# Patient Record
Sex: MALE | Race: WHITE | URBAN - METROPOLITAN AREA
[De-identification: names, ages, dates, MRNs, and addresses within clinical notes are randomized per-mention and may not be internally consistent; named-entity substitution may affect disease eponyms.]

---

## 2019-03-01 ENCOUNTER — OP HISTORICAL/CONVERTED ENCOUNTER (OUTPATIENT)
Dept: OTHER | Age: 69
End: 2019-03-01

## 2020-01-22 ENCOUNTER — OP HISTORICAL/CONVERTED ENCOUNTER (OUTPATIENT)
Dept: OTHER | Age: 70
End: 2020-01-22

## 2020-01-29 ENCOUNTER — OP HISTORICAL/CONVERTED ENCOUNTER (OUTPATIENT)
Dept: OTHER | Age: 70
End: 2020-01-29

## 2021-04-13 PROBLEM — H93.8X2 SENSATION OF FULLNESS IN LEFT EAR: Status: ACTIVE | Noted: 2021-04-13

## 2021-04-13 PROBLEM — H66.92 LEFT OTITIS MEDIA: Status: ACTIVE | Noted: 2021-04-13

## 2021-04-13 PROBLEM — H91.92 DECREASED HEARING OF LEFT EAR: Status: ACTIVE | Noted: 2021-04-13

## 2021-04-16 ENCOUNTER — OFFICE VISIT (OUTPATIENT)
Dept: ENT CLINIC | Age: 71
End: 2021-04-16
Payer: MEDICARE

## 2021-04-16 VITALS
SYSTOLIC BLOOD PRESSURE: 150 MMHG | TEMPERATURE: 98.9 F | DIASTOLIC BLOOD PRESSURE: 84 MMHG | OXYGEN SATURATION: 98 % | WEIGHT: 209.4 LBS | BODY MASS INDEX: 27.75 KG/M2 | HEART RATE: 100 BPM | HEIGHT: 73 IN | RESPIRATION RATE: 16 BRPM

## 2021-04-16 DIAGNOSIS — H93.8X2 PLUGGED FEELING IN EAR, LEFT: ICD-10-CM

## 2021-04-16 DIAGNOSIS — H91.92 DECREASED HEARING, LEFT: ICD-10-CM

## 2021-04-16 DIAGNOSIS — H69.83 ETD (EUSTACHIAN TUBE DYSFUNCTION), BILATERAL: ICD-10-CM

## 2021-04-16 DIAGNOSIS — H65.22 LEFT CHRONIC SEROUS OTITIS MEDIA: Primary | ICD-10-CM

## 2021-04-16 PROCEDURE — 99203 OFFICE O/P NEW LOW 30 MIN: CPT | Performed by: OTOLARYNGOLOGY

## 2021-04-16 NOTE — LETTER
4/19/2021 Patient: Dahlia Hill YOB: 1950 Date of Visit: 4/16/2021 Herson Brewster MD 
[address] [facility] [address] Via Fax: [phone] Dear Herson Brewster MD, Thank you for referring Mr. Youlanda Snellen to [address] [place] [address] NOSE, THROAT AND ALLERGY [facility] for evaluation. My notes for this consultation are attached. If you have questions, please do not hesitate to call me. I look forward to following your patient along with you. Sincerely, Cheli Tran MD

## 2021-04-16 NOTE — PROGRESS NOTES
Chief Complaint   Patient presents with    New Patient     Left Ear Clogged    Ringing in Ear     left x 2mths     Visit Vitals  BP (!) 150/84 (BP 1 Location: Left upper arm, BP Patient Position: Sitting, BP Cuff Size: Adult)   Pulse 100   Temp 98.9 °F (37.2 °C)   Resp 16   Ht 6' 1\" (1.854 m)   Wt 209 lb 6.4 oz (95 kg)   SpO2 98%   BMI 27.63 kg/m²

## 2021-04-16 NOTE — PROGRESS NOTES
Otolaryngology-Head and Neck Surgery  New Patient Visit     Patient: Teresita Murry  YOB: 1950  MRN: [id number]  Date of Service: 4/16/2021    Chief Complaint: Possible ear infection    History of Present Illness: Teresita Murry is a 78y.o. year old male who presents today for discussion of his left ear. About 2 months ago developed L ear plugging and decreased hearing. Seen by PCP - Rx with antibiotics x 2. At first seemed like initial course of antibiotics may have helped, however second course without any change    Stable over last few months    Denies pain  No otorrhea    R ear feels normal    No recent URI, injury   No dizziness    No hx of ear issues, no prior ear surgeries     Past Medical History:  Past Medical History:   Diagnosis Date    Coronary atherosclerosis of native coronary artery     Decreased hearing of left ear 4/13/2021    Essential hypertension, malignant     Hypercholesterolemia     Left otitis media 4/13/2021    Peripheral vascular disease, unspecified ([address]        Past Surgical History:   History reviewed. No pertinent surgical history. Medications:   Current Outpatient Medications   Medication Instructions    Amlodipine-Olmesartan (SUSHMA) 10-40 mg Tab DAILY    ASPIRIN PO 81 mg, DAILY    atorvastatin (LIPITOR) 10 mg, Oral, DAILY    Blood Glucose Strip-Disp Meter kit Does Not Apply    cilostazoL (PLETAL) 50 mg, Oral, 2 TIMES DAILY BEFORE MEALS    clopidogreL (PLAVIX) 75 mg, Oral    doxycycline (ADOXA) 100 mg, Oral, 2 TIMES DAILY    fexofenadine-pseudoephedrine (ALLEGRA-D)  mg Tb12 1 Tab, Oral, EVERY 12 HOURS    gabapentin (NEURONTIN) 300 mg, Oral, 3 TIMES DAILY    lancets (BD Ultra Fine Lancets) 33 gauge misc Does Not Apply    metFORMIN (GLUCOPHAGE) 500 mg, Oral, 2 TIMES DAILY WITH MEALS    minoxidiL (LONITEN) 10 mg, Oral, DAILY    QUEtiapine (SEROQUEL) 25 mg, Oral       Allergies:    Allergies   Allergen Reactions    Pcn [Penicillins] Unknown (comments)       Social History:   Social History     Socioeconomic History   Tobacco Use    Smoking status: Current Every Day Smoker     Packs/day: 1.00     Types: Cigarettes    Smokeless tobacco: Current User   Substance and Sexual Activity    Alcohol use: No    Drug use: No       Family History:  History reviewed. No pertinent family history. Review of Systems:  Consitutional: denies fever, excessive weight gain or loss. Eyes: denies diplopia, eye pain. Integumentary: denies new concerning skin lesions. Ears, Nose, Mouth, Throat: denies except as per HPI. Endocrine: denies hot or cold intolerance, increased thirst.  Respiratory: denies cough, hemoptysis, wheezing  Gastrointestinal: denies trouble swallowing, nausea, emesis, regurgitation  Musculoskeletal: denies muscle weakness or wasting  Cardiovascular: denies chest pain, shortness of breath  Neurologic: denies seizures, numbness or tingling, syncope  Hematologic: denies easy bleeding or bruising    Physical Examination:   Vitals:    04/16/21 1429   BP: (!) 150/84   BP 1 Location: Left upper arm   BP Patient Position: Sitting   BP Cuff Size: Adult   Pulse: 100   Resp: 16   Temp: 98.9 °F (37.2 °C)   SpO2: 98%   Weight: 209 lb 6.4 oz (95 kg)   Height: 6' 1\" (1.854 m)        General: Comfortable, pleasant, appears stated age  Voice: Strong, speaking in full sentences, no stridor    Face: No masses or lesions, facial strength symmetric   Ears: External ears unremarkable. Bilateral ear canal clear. Left ear with serous effusion. R ear Tympanic membrane clear and intact, with visible landmarks. Clear middle ear space  Nose: External nose unremarkable. Dorsum midline. Anterior rhinoscopy demonstrates no lesions. Septum midline. Turbinates without hypertrophy. Oral Cavity / Oropharynx: No trismus. Mucosa pink and moist. No lesions. Tongue is midline and mobile. Palate elevates symmetrically. Uvula midline. Tonsils unremarkable. Base of tongue soft.  Floor of mouth soft. Neck: Supple. No adenopathy. Thyroid unremarkable. Palpable laryngeal landmarks. Full neck range of motion   Neurologic: CN II - XI intact. Normal gait    PROCEDURE: NASAL ENDOSCOPY    Preoperative Diagnosis: Unilateral effusion     Postoperative Diagnosis: Same as above    Procedure: Nasal Endoscopy    Anesthesia: Topical 4% Lidocaine, Oxymetazoline    Description of Procedure: Verbal informed consent was obtained. After application of topical anesthetic and decongestant, the endoscope was introduced to the patient's nare. It was passed through the nose and into the nasopharynx. The scope was then withdrawn and repeated on the opposing nare. The patient tolerated the procedure well. Findings: Septum without deformity or deviation. Bilateral inferior turbinates without hypertrophy. Moderate mucosal edema. No polyposis or purulence. Middle meatus clear bilaterally. Nasopharynx clear without masses or lesions. Eustachian tube orifice unremarkable. Assessment and Plan:   1. Left serous otitis media with effusion  2. Left ear decreased hearing  3. ETD  - Normal nasopharynx on scope exam  - Has tried decongestants, antihistamines, antibiotics   - Symptoms persistent > 2 months at this point  - Discussed options of myringotomy +/- tube, vs trial of prednisone/flonase  - Given DM2 and duration of symptoms, we've elected to move forward with at least myringotomy   - Follow up on Wednesday and will plan myringotomy          The patient was instructed to return to clinic if no improvement or progression of symptoms. Signs to watch out for reviewed.       MD Alice Armendar[address] [facility]  [address] [place] [address]  Office Phone: [phone]

## 2021-04-21 ENCOUNTER — OFFICE VISIT (OUTPATIENT)
Dept: ENT CLINIC | Age: 71
End: 2021-04-21
Payer: MEDICARE

## 2021-04-21 ENCOUNTER — OFFICE VISIT (OUTPATIENT)
Dept: ENT CLINIC | Age: 71
End: 2021-04-21

## 2021-04-21 VITALS
OXYGEN SATURATION: 97 % | HEIGHT: 73 IN | BODY MASS INDEX: 27.57 KG/M2 | SYSTOLIC BLOOD PRESSURE: 160 MMHG | RESPIRATION RATE: 16 BRPM | WEIGHT: 208 LBS | HEART RATE: 92 BPM | DIASTOLIC BLOOD PRESSURE: 90 MMHG

## 2021-04-21 DIAGNOSIS — H90.A32 MIXED CONDUCTIVE AND SENSORINEURAL HEARING LOSS OF LEFT EAR WITH RESTRICTED HEARING OF RIGHT EAR: ICD-10-CM

## 2021-04-21 DIAGNOSIS — H90.A21 SENSORINEURAL HEARING LOSS (SNHL) OF RIGHT EAR WITH RESTRICTED HEARING OF LEFT EAR: ICD-10-CM

## 2021-04-21 DIAGNOSIS — H90.A32 MIXED CONDUCTIVE AND SENSORINEURAL HEARING LOSS OF LEFT EAR WITH RESTRICTED HEARING OF RIGHT EAR: Primary | ICD-10-CM

## 2021-04-21 DIAGNOSIS — H65.22 LEFT CHRONIC SEROUS OTITIS MEDIA: ICD-10-CM

## 2021-04-21 PROCEDURE — 69420 INCISION OF EARDRUM: CPT | Performed by: OTOLARYNGOLOGY

## 2021-04-21 PROCEDURE — 99213 OFFICE O/P EST LOW 20 MIN: CPT | Performed by: OTOLARYNGOLOGY

## 2021-04-21 PROCEDURE — 92557 COMPREHENSIVE HEARING TEST: CPT | Performed by: AUDIOLOGIST

## 2021-04-21 PROCEDURE — 92567 TYMPANOMETRY: CPT | Performed by: AUDIOLOGIST

## 2021-04-21 RX ORDER — OFLOXACIN 3 MG/ML
2-3 SOLUTION AURICULAR (OTIC) 2 TIMES DAILY
Qty: 5 ML | Refills: 0 | Status: SHIPPED | OUTPATIENT
Start: 2021-04-21 | End: 2021-04-24

## 2021-04-21 NOTE — PROGRESS NOTES
Chief Complaint   Patient presents with    Follow-up     Hearing Test    Procedure     Visit Vitals  BP (!) 160/90 (BP 1 Location: Left upper arm, BP Patient Position: Sitting, BP Cuff Size: Adult)   Pulse 92   Resp 16   Ht 6' 1\" (1.854 m)   Wt 208 lb (94.3 kg)   SpO2 97%   BMI 27.44 kg/m²

## 2021-04-21 NOTE — PROGRESS NOTES
Otolaryngology-Head and Neck Surgery  Follow Up Patient Visit     Patient: Williams Castro  YOB: 1950  MRN: [id number]  Date of Service: 4/21/2021    Chief Complaint: Possible ear infection    Interval hx:   Presents after audiogram    History of Present Illness: Williams Castro is a 78y.o. year old male who presents today for discussion of his left ear. About 2 months ago developed L ear plugging and decreased hearing. Seen by PCP - Rx with antibiotics x 2. At first seemed like initial course of antibiotics may have helped, however second course without any change    Stable over last few months    Denies pain  No otorrhea    R ear feels normal    No recent URI, injury   No dizziness    No hx of ear issues, no prior ear surgeries     Past Medical History:  Past Medical History:   Diagnosis Date    Coronary atherosclerosis of native coronary artery     Decreased hearing of left ear 4/13/2021    Essential hypertension, malignant     Hypercholesterolemia     Left otitis media 4/13/2021    Peripheral vascular disease, unspecified ([facility] [address]        Past Surgical History:   History reviewed. No pertinent surgical history.     Medications:   Current Outpatient Medications   Medication Instructions    Amlodipine-Olmesartan (SUSHMA) 10-40 mg Tab DAILY    ASPIRIN PO 81 mg, DAILY    atorvastatin (LIPITOR) 10 mg, Oral, DAILY    Blood Glucose Strip-Disp Meter kit Does Not Apply    cilostazoL (PLETAL) 50 mg, Oral, 2 TIMES DAILY BEFORE MEALS    clopidogreL (PLAVIX) 75 mg, Oral    doxycycline (ADOXA) 100 mg, Oral, 2 TIMES DAILY    fexofenadine-pseudoephedrine (ALLEGRA-D)  mg Tb12 1 Tab, Oral, EVERY 12 HOURS    gabapentin (NEURONTIN) 300 mg, Oral, 3 TIMES DAILY    lancets (BD Ultra Fine Lancets) 33 gauge misc Does Not Apply    metFORMIN (GLUCOPHAGE) 500 mg, Oral, 2 TIMES DAILY WITH MEALS    minoxidiL (LONITEN) 10 mg, Oral, DAILY    ofloxacin (FLOXIN) 0.3 % otic solution 2-3 Drops, Left Ear, 2 TIMES DAILY    QUEtiapine (SEROQUEL) 25 mg, Oral       Allergies: Allergies   Allergen Reactions    Pcn [Penicillins] Unknown (comments)       Social History:   Social History     Socioeconomic History   Tobacco Use    Smoking status: Current Every Day Smoker     Packs/day: 1.00     Types: Cigarettes    Smokeless tobacco: Current User   Substance and Sexual Activity    Alcohol use: No    Drug use: No       Family History:  Family History   Problem Relation Age of Onset    Heart Disease Mother     Cancer Father        Review of Systems:  Consitutional: denies fever, excessive weight gain or loss. Eyes: denies diplopia, eye pain. Integumentary: denies new concerning skin lesions. Ears, Nose, Mouth, Throat: denies except as per HPI. Endocrine: denies hot or cold intolerance, increased thirst.  Respiratory: denies cough, hemoptysis, wheezing  Gastrointestinal: denies trouble swallowing, nausea, emesis, regurgitation  Musculoskeletal: denies muscle weakness or wasting  Cardiovascular: denies chest pain, shortness of breath  Neurologic: denies seizures, numbness or tingling, syncope  Hematologic: denies easy bleeding or bruising    Physical Examination:   Vitals:    04/21/21 1118   BP: (!) 160/90   BP 1 Location: Left upper arm   BP Patient Position: Sitting   BP Cuff Size: Adult   Pulse: 92   Resp: 16   SpO2: 97%   Weight: 208 lb (94.3 kg)   Height: 6' 1\" (1.854 m)        General: Comfortable, pleasant, appears stated age  Voice: Strong, speaking in full sentences, no stridor    Face: No masses or lesions, facial strength symmetric   Ears: External ears unremarkable. Bilateral ear canal clear. Left ear with serous effusion. R ear Tympanic membrane clear and intact, with visible landmarks. Clear middle ear space  Nose: External nose unremarkable. Dorsum midline. Anterior rhinoscopy demonstrates no lesions. Septum midline. Turbinates without hypertrophy. Oral Cavity / Oropharynx: No trismus.  Mucosa pink and moist. No lesions. Tongue is midline and mobile. Palate elevates symmetrically. Uvula midline. Tonsils unremarkable. Base of tongue soft. Floor of mouth soft. Neck: Supple. No adenopathy. Thyroid unremarkable. Palpable laryngeal landmarks. Full neck range of motion   Neurologic: CN II - XI intact. Normal gait    PROCEDURE: NASAL ENDOSCOPY from LOV     Preoperative Diagnosis: Unilateral effusion     Postoperative Diagnosis: Same as above    Procedure: Nasal Endoscopy    Anesthesia: Topical 4% Lidocaine, Oxymetazoline    Description of Procedure: Verbal informed consent was obtained. After application of topical anesthetic and decongestant, the endoscope was introduced to the patient's nare. It was passed through the nose and into the nasopharynx. The scope was then withdrawn and repeated on the opposing nare. The patient tolerated the procedure well. Findings: Septum without deformity or deviation. Bilateral inferior turbinates without hypertrophy. Moderate mucosal edema. No polyposis or purulence. Middle meatus clear bilaterally. Nasopharynx clear without masses or lesions. Eustachian tube orifice unremarkable. PROCEDURE: BILATERAL MYRINGOTOMY WITH TYMPANOSTOMY TUBE PLACEMENT    Preoperative diagnosis: Left serous otitis media with effusion   Postoperative diagnosis:  Same as above     Risks, benefits and alternatives were reviewed including but not limited to risk of bleeding, infection, TM perforation, hearing loss, no improvement in hearing, otorrhea, need for repeat/revision procedure. Informed consent was obtained and the patient agreed to the procedure. Under microscopy, the left ear was inspected with a speculum. Any obstructive cerumen or debris was removed. The inferior tympanic membrane was anesthetized with phenol. A radial myringotomy was performed in the inferior quadrant and a serous effusion was evacuated from the myringotomy site. Ciprodex drops were then instilled.   The patient tolerated the procedure well. Assessment and Plan:   1. Left serous otitis media with effusion  2. Left mixed hearing loss, right ear SNHL  3. ETD  - Normal nasopharynx on scope exam  - Has tried decongestants, antihistamines, antibiotics   - S/p myringotomy in the office today  - discussed myringotomy vs tube placement  - Since this is his first time with ear issues, we did a myringotomy in hopes this is all that is needed; understands need for repeat procedure with tube if recurs once TM heals  - Follow up in 3-4 weeks to ensure TM has healed and no recurrence of effusion          The patient was instructed to return to clinic if no improvement or progression of symptoms. Signs to watch out for reviewed.       MD Adalberto [place] [address] [facility]  [address] [place] [address] [address]  [facility]  Office Phone: [phone]

## 2021-04-21 NOTE — LETTER
4/21/2021 Patient: Joseline Bonds YOB: 1950 Date of Visit: 4/21/2021 [place] [address] Via Fax: [phone] Dear Salomon Simpson MD, Thank you for referring Mr. Jah Marshall to [address] [place] [address], NOSE, THROAT AND ALLERGY [facility] for evaluation. My notes for this consultation are attached. If you have questions, please do not hesitate to call me. I look forward to following your patient along with you. Sincerely, Deanne Singh MD

## 2021-05-20 ENCOUNTER — OFFICE VISIT (OUTPATIENT)
Dept: ENT CLINIC | Age: 71
End: 2021-05-20
Payer: MEDICARE

## 2021-05-20 VITALS
HEART RATE: 79 BPM | HEIGHT: 73 IN | SYSTOLIC BLOOD PRESSURE: 140 MMHG | DIASTOLIC BLOOD PRESSURE: 78 MMHG | RESPIRATION RATE: 16 BRPM | BODY MASS INDEX: 27.01 KG/M2 | WEIGHT: 203.8 LBS | OXYGEN SATURATION: 98 %

## 2021-05-20 DIAGNOSIS — H65.22 LEFT CHRONIC SEROUS OTITIS MEDIA: Primary | ICD-10-CM

## 2021-05-20 PROCEDURE — 99212 OFFICE O/P EST SF 10 MIN: CPT | Performed by: OTOLARYNGOLOGY

## 2021-05-20 NOTE — PROGRESS NOTES
Otolaryngology-Head and Neck Surgery  Follow Up Patient Visit     Patient: Bayron Davis  YOB: 1950  MRN: [id number]  Date of Service: 5/20/2021    Chief Complaint: Follow up ears    Interval hx:   S/p L myringotomy for persistent effusion  Notes doing well, left ear has normalized     History of Present Illness: Bayron Davis is a 78y.o. year old male who presents today for discussion of his left ear. About 2 months ago developed L ear plugging and decreased hearing. Seen by PCP - Rx with antibiotics x 2. At first seemed like initial course of antibiotics may have helped, however second course without any change    Stable over last few months    Denies pain  No otorrhea    R ear feels normal    No recent URI, injury   No dizziness    No hx of ear issues, no prior ear surgeries     Past Medical History:  Past Medical History:   Diagnosis Date    Coronary atherosclerosis of native coronary artery     Decreased hearing of left ear 4/13/2021    Essential hypertension, malignant     Hypercholesterolemia     Left otitis media 4/13/2021    Peripheral vascular disease, unspecified ([facility] [address]        Past Surgical History:   History reviewed. No pertinent surgical history.     Medications:   Current Outpatient Medications   Medication Instructions    Amlodipine-Olmesartan (SUSHMA) 10-40 mg Tab DAILY    ASPIRIN PO 81 mg, DAILY    atorvastatin (LIPITOR) 10 mg, Oral, DAILY    Blood Glucose Strip-Disp Meter kit Does Not Apply    cilostazoL (PLETAL) 50 mg, Oral, 2 TIMES DAILY BEFORE MEALS    clopidogreL (PLAVIX) 75 mg, Oral    doxycycline (ADOXA) 100 mg, Oral, 2 TIMES DAILY    fexofenadine-pseudoephedrine (ALLEGRA-D)  mg Tb12 1 Tablet, Oral, EVERY 12 HOURS    gabapentin (NEURONTIN) 300 mg, Oral, 3 TIMES DAILY    lancets (BD Ultra Fine Lancets) 33 gauge misc Does Not Apply    metFORMIN (GLUCOPHAGE) 500 mg, Oral, 2 TIMES DAILY WITH MEALS    minoxidiL (LONITEN) 10 mg, Oral, DAILY    QUEtiapine (SEROQUEL) 25 mg, Oral       Allergies: Allergies   Allergen Reactions    Pcn [Penicillins] Unknown (comments)       Social History:   Social History     Socioeconomic History   Tobacco Use    Smoking status: Current Every Day Smoker     Packs/day: 1.00     Types: Cigarettes    Smokeless tobacco: Current User   Substance and Sexual Activity    Alcohol use: No    Drug use: No       Family History:  Family History   Problem Relation Age of Onset    Heart Disease Mother     Cancer Father        Review of Systems:  Consitutional: denies fever, excessive weight gain or loss. Eyes: denies diplopia, eye pain. Integumentary: denies new concerning skin lesions. Ears, Nose, Mouth, Throat: denies except as per HPI. Endocrine: denies hot or cold intolerance, increased thirst.  Respiratory: denies cough, hemoptysis, wheezing  Gastrointestinal: denies trouble swallowing, nausea, emesis, regurgitation  Musculoskeletal: denies muscle weakness or wasting  Cardiovascular: denies chest pain, shortness of breath  Neurologic: denies seizures, numbness or tingling, syncope  Hematologic: denies easy bleeding or bruising    Physical Examination:   Vitals:    05/20/21 0949   BP: (!) 140/78   BP 1 Location: Left upper arm   BP Patient Position: Sitting   BP Cuff Size: Adult   Pulse: 79   Resp: 16   SpO2: 98%   Weight: 203 lb 12.8 oz (92.4 kg)   Height: 6' 1\" (1.854 m)        General: Comfortable, pleasant, appears stated age  Voice: Strong, speaking in full sentences, no stridor    Face: No masses or lesions, facial strength symmetric   Ears: External ears unremarkable. Bilateral ear canal clear. L Ear clear. Tm has healed. No recurrence of effusion. R ear Tympanic membrane clear and intact, with visible landmarks. Clear middle ear space  Nose: External nose unremarkable. Dorsum midline. Anterior rhinoscopy demonstrates no lesions. Septum midline. Turbinates without hypertrophy. Oral Cavity / Oropharynx: No trismus.  Mucosa pink and moist. No lesions. Tongue is midline and mobile. Palate elevates symmetrically. Uvula midline. Tonsils unremarkable. Base of tongue soft. Floor of mouth soft. Neck: Supple. No adenopathy. Thyroid unremarkable. Palpable laryngeal landmarks. Full neck range of motion   Neurologic: CN II - XI intact. Normal gait              Assessment and Plan:   1. Left serous otitis media with effusion  2. Left mixed hearing loss, right ear SNHL  3. ETD  - Normal nasopharynx on scope exam from last office visit   - Has tried decongestants, antihistamines, antibiotics   - S/p myringotomy in the office last month   - No recurrence of effusion   - TM has healed at this point   - RTC PRN - if any recurrence of symptoms, will plan tube     The patient was instructed to return to clinic if no improvement or progression of symptoms. Signs to watch out for reviewed.       MD [facility]  [address] [place] [address]  Office Phone: [phone]

## 2021-05-20 NOTE — LETTER
5/20/2021 Patient: Bayron Davis YOB: 1950 Date of Visit: 5/20/2021 Yvette Luna, [address] [address] Via Fax: [phone] Dear Yvette Luna MD, Thank you for referring Mr. Butch Henriquez to [facility] EAR NOSE AND THROAT [address] for evaluation. My notes for this consultation are attached. If you have questions, please do not hesitate to call me. I look forward to following your patient along with you. Sincerely, Awilda Hugo MD

## 2021-05-20 NOTE — PROGRESS NOTES
Chief Complaint   Patient presents with    Follow-up     hearing loss     Visit Vitals  BP (!) 140/78 (BP 1 Location: Left upper arm, BP Patient Position: Sitting, BP Cuff Size: Adult)   Pulse 79   Resp 16   Ht 6' 1\" (1.854 m)   Wt 203 lb 12.8 oz (92.4 kg)   SpO2 98%   BMI 26.89 kg/m²

## 2022-03-18 PROBLEM — H91.92 DECREASED HEARING OF LEFT EAR: Status: ACTIVE | Noted: 2021-04-13

## 2022-03-18 PROBLEM — H93.8X2 SENSATION OF FULLNESS IN LEFT EAR: Status: ACTIVE | Noted: 2021-04-13

## 2022-03-19 PROBLEM — H66.92 LEFT OTITIS MEDIA: Status: ACTIVE | Noted: 2021-04-13

## 2023-05-11 RX ORDER — MINOXIDIL 10 MG/1
10 TABLET ORAL DAILY
COMMUNITY

## 2023-05-11 RX ORDER — FEXOFENADINE HCL AND PSEUDOEPHEDRINE HCI 60; 120 MG/1; MG/1
1 TABLET, EXTENDED RELEASE ORAL EVERY 12 HOURS
COMMUNITY

## 2023-05-11 RX ORDER — QUETIAPINE FUMARATE 25 MG/1
25 TABLET, FILM COATED ORAL
COMMUNITY

## 2023-05-11 RX ORDER — GABAPENTIN 300 MG/1
CAPSULE ORAL 3 TIMES DAILY
COMMUNITY

## 2023-05-11 RX ORDER — DOXYCYCLINE 100 MG/1
TABLET ORAL 2 TIMES DAILY
COMMUNITY

## 2023-05-11 RX ORDER — CLOPIDOGREL BISULFATE 75 MG/1
75 TABLET ORAL
COMMUNITY

## 2023-05-11 RX ORDER — AMLODIPINE AND OLMESARTAN MEDOXOMIL 10; 40 MG/1; MG/1
TABLET ORAL DAILY
COMMUNITY
Start: 2011-01-26

## 2023-05-11 RX ORDER — CILOSTAZOL 50 MG/1
TABLET ORAL
COMMUNITY

## 2023-05-11 RX ORDER — ATORVASTATIN CALCIUM 10 MG/1
10 TABLET, FILM COATED ORAL DAILY
COMMUNITY